# Patient Record
Sex: FEMALE | Race: WHITE | NOT HISPANIC OR LATINO | Employment: UNEMPLOYED | URBAN - METROPOLITAN AREA
[De-identification: names, ages, dates, MRNs, and addresses within clinical notes are randomized per-mention and may not be internally consistent; named-entity substitution may affect disease eponyms.]

---

## 2017-01-03 ENCOUNTER — TELEPHONE (OUTPATIENT)
Dept: ENDOCRINOLOGY | Facility: CLINIC | Age: 52
End: 2017-01-03

## 2017-01-03 NOTE — TELEPHONE ENCOUNTER
----- Message from Yary Manzano sent at 1/3/2017 10:38 AM CST -----  Contact: self  Call placed to pod.  Returning your call. Please call back at

## 2017-01-03 NOTE — TELEPHONE ENCOUNTER
----- Message from Rashida Peterson sent at 1/3/2017  8:39 AM CST -----  Contact: oumar   Rescheduling appt, does not want to wait till  05 2017, refused Lir[facility] Appt in 03 2017  Call back

## 2017-01-03 NOTE — TELEPHONE ENCOUNTER
LM educating pt there was no openings at this time for Dr Bernstein until mid May, requested to call back if rescheduling is still needed.

## 2017-01-03 NOTE — TELEPHONE ENCOUNTER
Spoke with pt, states she was told by her insurance that [facility] do longer accepts [facility] care, educated pt that was not my area of expert advise i was unable to verify this information, pt states she now has a new form of insurance an needed to reschedule labs an appt with dr lowery for a later date due to new coverage, rescheduled appts as requested